# Patient Record
Sex: FEMALE
[De-identification: names, ages, dates, MRNs, and addresses within clinical notes are randomized per-mention and may not be internally consistent; named-entity substitution may affect disease eponyms.]

---

## 2022-08-09 PROBLEM — Z00.00 ENCOUNTER FOR PREVENTIVE HEALTH EXAMINATION: Status: ACTIVE | Noted: 2022-08-09

## 2022-08-10 ENCOUNTER — APPOINTMENT (OUTPATIENT)
Dept: PULMONOLOGY | Facility: CLINIC | Age: 32
End: 2022-08-10

## 2022-08-10 VITALS
TEMPERATURE: 98 F | SYSTOLIC BLOOD PRESSURE: 124 MMHG | DIASTOLIC BLOOD PRESSURE: 82 MMHG | BODY MASS INDEX: 51.16 KG/M2 | WEIGHT: 278 LBS | HEART RATE: 97 BPM | OXYGEN SATURATION: 97 % | HEIGHT: 62 IN

## 2022-08-10 DIAGNOSIS — R06.83 SNORING: ICD-10-CM

## 2022-08-10 PROCEDURE — 94060 EVALUATION OF WHEEZING: CPT

## 2022-08-10 PROCEDURE — 94729 DIFFUSING CAPACITY: CPT

## 2022-08-10 PROCEDURE — 99203 OFFICE O/P NEW LOW 30 MIN: CPT | Mod: 25

## 2022-08-10 PROCEDURE — 94727 GAS DIL/WSHOT DETER LNG VOL: CPT

## 2022-08-11 NOTE — PHYSICAL EXAM
[No Acute Distress] : no acute distress [Well Nourished] : well nourished [Well Developed] : well developed [Enlarged Base of the Tongue] : enlarged base of the tongue [II] : Mallampati Class: II [Normal Appearance] : normal appearance [Supple] : supple [No Neck Mass] : no neck mass [No JVD] : no jvd [Normal Rate/Rhythm] : normal rate/rhythm [Normal S1, S2] : normal s1, s2 [No Murmurs] : no murmurs [No Resp Distress] : no resp distress [Clear to Auscultation Bilaterally] : clear to auscultation bilaterally [Benign] : benign [Not Tender] : not tender [No Masses] : no masses [No Clubbing] : no clubbing [No Edema] : no edema [No Focal Deficits] : no focal deficits [Oriented x3] : oriented x3 [Normal Affect] : normal affect [TextBox_2] : elevated  [TextBox_68] : diminished aeration

## 2022-08-11 NOTE — PROCEDURE
[FreeTextEntry1] : PFT\par \par normal spirometry, lung volumes and diffusion\par \par \par  [facility]

## 2022-08-11 NOTE — HISTORY OF PRESENT ILLNESS
[Snoring] : snoring [TextBox_4] : 31 year old patient presents for preoperative pulmonary evaluation for planned bariatric surgery. \par \par She has a  history of of asthma that is seasonal.  She uses albuterol MDI as needed.\par She has seasonal allergy, using OTC. \par \par She last used oral steroid 10 years ago\par \par She has not had exacerbations for several years. \par \par She denies wheeze. \par \par The patient has no active pulmonary problems.  The patient denies history of COPD,  pneumonia, chest pain, postnasal drip,  sinusitis, cough, sputum production or shortness of breath. \par \par \par Primary doctor is Luma Quintanilla NP\par \par Surgeon is Dr. Eliezer Collier \par \par \par PSH:\par \par none\par \par \par PMH:\par \par allergic asthma\par \par seasonal allergy\par \par \par SH:\par \par never smoker\par \par use occasional cbs\par \par ETOH:  occasional\par \par \par Occupation: behavior specialist\par \par No exposure to chemicals, dust, asbestos, mold\par \par \par \par ALLERGY:\par \par NKDA\par \par \par environmental/seasonal allergy: seasonal \par \par \par \par Review of Systems:\par \par no sinusitis, sinus infections, nasal obstruction\par no dysphagia\par no dry mouth\par \par no arthritis\par no joint aches\par no joint swelling\par \par \par no pneumonia\par \par no lung cancer\par \par no CAD\par no MI\par no chest pain\par no murmur\par no CHF\par no HTN\par no edema\par \par no peptic ulcer or gastritis\par no GERD\par no abdominal pain\par no liver disease\par \par no Diabetes\par no thyroid disease\par no hyperlipidemia\par \par \par no bleeding\par \par no DVT or PE\par \par no kidney disease\par \par no stroke\par no seizure\par \par She had COVID in past, no complications\par \par \par \par \par \par \par \par \par \par \par \par   [Hypersomnolence] : denies hypersomnolence [Witnessed Apneas] : no witnessed apneas

## 2022-08-11 NOTE — DISCUSSION/SUMMARY
[FreeTextEntry1] : 31 year old woman with allergic asthma who presents for preoperative pulmonary evaluation for planned bariatric surgery. \par \par Her asthma has been under control using albuterol\par \par PFT demonstrated normal spirometry, lung volumes and diffusion\par \par PLAN\par \par Obtain CXR\par \par Ordered overnight sleep study \par \par Further recommendations based on results. \par \par \par Florentin Arana MD

## 2022-11-02 ENCOUNTER — APPOINTMENT (OUTPATIENT)
Dept: PULMONOLOGY | Facility: CLINIC | Age: 32
End: 2022-11-02

## 2022-11-02 VITALS
SYSTOLIC BLOOD PRESSURE: 126 MMHG | OXYGEN SATURATION: 99 % | HEART RATE: 101 BPM | TEMPERATURE: 98.4 F | DIASTOLIC BLOOD PRESSURE: 94 MMHG | BODY MASS INDEX: 50.3 KG/M2 | WEIGHT: 275 LBS

## 2022-11-02 DIAGNOSIS — J45.909 UNSPECIFIED ASTHMA, UNCOMPLICATED: ICD-10-CM

## 2022-11-02 DIAGNOSIS — Z01.818 ENCOUNTER FOR OTHER PREPROCEDURAL EXAMINATION: ICD-10-CM

## 2022-11-02 DIAGNOSIS — G47.33 OBSTRUCTIVE SLEEP APNEA (ADULT) (PEDIATRIC): ICD-10-CM

## 2022-11-02 PROCEDURE — 99214 OFFICE O/P EST MOD 30 MIN: CPT

## 2022-11-02 NOTE — REASON FOR VISIT
[Consultation] : a consultation [Pre-op Risk Stratification] : pre-op risk stratification [Asthma] : asthma

## 2022-12-07 NOTE — CONSULT LETTER
[Dear  ___] : Dear  [unfilled], [FreeTextEntry1] : \par \par She has been using CPAP for over 2 weeks with good results\par \par She is optimized for surgery.\par \par There is no pulmonary contraindication to the planned surgery. The patient is at some increased risk for perioperative pulmonary compilations due to presence of sleep apnea and elevated BMI. The patient should have cautious monitoring for oxygen desaturation or respiratory depression in the postoperative period. BIPAP should be available if respiratory depression develops. Usual DVT prophylaxis  is necessary.

## 2022-12-07 NOTE — ADDENDUM
[FreeTextEntry1] : She has been using CPAP for over 2 weeks with good results\par \par She is optimized for surgery.\par \par There is no pulmonary contraindication to the planned surgery. The patient is at some increased risk for perioperative pulmonary compilations due to presence of sleep apnea and elevated BMI. The patient should have cautious monitoring for oxygen desaturation or respiratory depression in the postoperative period. BIPAP should be available if respiratory depression develops. Usual DVT prophylaxis  is necessary.\par \par

## 2022-12-07 NOTE — DISCUSSION/SUMMARY
[FreeTextEntry1] : 31 year old woman with allergic asthma who presents for preoperative pulmonary evaluation for planned bariatric surgery. \par \par Her asthma has been under control using albuterol\par \par PFT demonstrated normal spirometry, lung volumes and diffusion\par \par overnight sleep study demonstrated obstructive sleep apnea with AHI\par \par CPAP titration completed\par She will use CPAP 10 with F20 FFM\par \par She will be optimized for surgery once she has obtained CPAP and has demonstrated ability to use it\par \par Total time spent : 30 minutes\par Including:\par Preparation prior to visit - Reviewing prior record, results of tests and Consultation Reports as applicable\par Conducting an appropriate H & P during today's encounter\par Appropriate orders for tests, medications and procedures, as applicable\par Counseling patient \par Note completion \par \par \par Florentin Arana MD

## 2022-12-07 NOTE — HISTORY OF PRESENT ILLNESS
[Snoring] : snoring [Obstructive Sleep Apnea] : obstructive sleep apnea [TextBox_4] : 31 year old patient presents for preoperative pulmonary evaluation for planned bariatric surgery. \par \par She has a  history of of asthma that is seasonal.  She uses albuterol MDI as needed.\par She has seasonal allergy, using OTC. \par \par She last used oral steroid 10 years ago\par \par She has not had exacerbations for several years. \par \par She denies wheeze. \par \par The patient has no active pulmonary problems.  The patient denies history of COPD,  pneumonia, chest pain, postnasal drip,  sinusitis, cough, sputum production or shortness of breath. \par \par She has been breathing well without asthma symptoms.  she has not needed to use inhaled bronchodilator \par \par She underwent overnight sleep study that demonstrated mild obstructive sleep apnea with o2 desaturation\par \par She ha shad CPAP titration and CPAP will be ordered.\par \par \par Primary doctor is Luma Quintanilla NP\par \par Surgeon is Dr. Eliezer Collier \par \par \par PSH:\par \par none\par \par \par PMH:\par \par allergic asthma\par \par seasonal allergy\par \par \par SH:\par \par never smoker\par \par use occasional cbd\par \par ETOH:  occasional\par \par \par Occupation: behavior specialist\par \par No exposure to chemicals, dust, asbestos, mold\par \par \par \par ALLERGY:\par \par NKDA\par \par \par environmental/seasonal allergy: seasonal \par \par \par \par Review of Systems:\par \par no sinusitis, sinus infections, nasal obstruction\par no dysphagia\par no dry mouth\par \par no arthritis\par no joint aches\par no joint swelling\par \par \par no pneumonia\par \par no lung cancer\par \par no CAD\par no MI\par no chest pain\par no murmur\par no CHF\par no HTN\par no edema\par \par no peptic ulcer or gastritis\par no GERD\par no abdominal pain\par no liver disease\par \par no Diabetes\par no thyroid disease\par no hyperlipidemia\par \par \par no bleeding\par \par no DVT or PE\par \par no kidney disease\par \par no stroke\par no seizure\par \par She had COVID in past, no complications\par \par \par \par \par \par \par \par \par \par \par \par   [Hypersomnolence] : denies hypersomnolence [Witnessed Apneas] : no witnessed apneas

## 2022-12-07 NOTE — PROCEDURE
[FreeTextEntry1] : PFT\par \par normal spirometry, lung volumes and diffusion\par \par \par  [facility]